# Patient Record
Sex: FEMALE | Race: WHITE
[De-identification: names, ages, dates, MRNs, and addresses within clinical notes are randomized per-mention and may not be internally consistent; named-entity substitution may affect disease eponyms.]

---

## 2019-07-11 NOTE — EKG
Test Reason : PREOP

Blood Pressure : ***/*** mmHG

Vent. Rate : 075 BPM     Atrial Rate : 075 BPM

   P-R Int : 164 ms          QRS Dur : 126 ms

    QT Int : 466 ms       P-R-T Axes : -17 -30 122 degrees

   QTc Int : 520 ms

 

Normal sinus rhythm

Left axis deviation

Left bundle branch block

Abnormal ECG

 

Confirmed by TOMMY HERNANDES ([id number]) on 7/11/2019 4:22:54 PM

 

Referred By:  ARIANA           Confirmed By:TOMMY HERNANDES

## 2019-07-12 NOTE — OP
DATE OF PROCEDURE:  07/11/2019



PREOPERATIVE DIAGNOSES:  Left facial pain, left maxillary mass, left chronic

sinusitis. 



POSTOPERATIVE DIAGNOSES:  Left facial pain, left maxillary mass, left chronic

sinusitis. 



PROCEDURE PERFORMED:  

1. Left nasal endoscopy with maxillary antrostomy with removal of tissue.

2. Left total ethmoidectomy.

3. Left frontal sinusotomy.



PROCEDURE IN DETAIL:  Left nasal endoscopy with maxillary antrostomy with removal of

tissue:  The uncinate was then identified and the extent of the uncinate was

appreciated by out-fracturing the uncinate with the ball-tip probe.  We then used

the sickle blade to disarticulate the uncinate from the lateral nasal wall.  This

was then removed with straight biting and upbiting punches with the remaining

shrouds of mucosa and bony septum removed with the micro-debrider.  The natural os

of the maxillary sinus was then identified and enlarged with the maxillary punches

and back biting forceps. 



Left total ethmoidectomy:  The anterior face of the ethmoid bulla was entered and

with the micro-debrider, dissection continued posteriorly to the ground lamella.

The limits of dissection included the insertion of the middle turbinate, medial

orbital wall, and base of skull.  We similarly identified the frontal recess and

removed shrouds of bone and debris in that region to obtain patency into the agger

nasi region and frontal recess.  We then entered the ground lamella and its

anteroinferior aspect and proceeded posteriorly, opening the posterior ethmoid

air-cell system.  Again, the limits of dissection included the base of skull and

medial orbital wall. 



Left frontal sinusotomy:  Following the ethmoidectomy, we then turned our attention

to the frontal nasal recess. The agger nasi cells were addressed and the frontal

recess was exposed. The natural opening to the frontal sinus was identified. At this

point, any obstructing shrouds of mucosa and bony fragments were removed with a

curved microdebrider. The wound was then examined and found to be free of any

obstructing debris. We then turned our attention to the contralateral side and

performed a similar procedure again under endoscopic visualization using a 45-degree

scope. We were able to visualize the frontal recess. Obstructing shrouds of mucosa

and bone were removed with a microdebrider. The natural os of frontal sinus was

identified and enlarged and irrigated.   At this point, the frontal sinusotomy was

completed and we turned to the next area of concern. 







Job ID:  [id number]

## 2019-08-13 NOTE — PET
PET CT:

8/13/19

 

HISTORY: 

64-year-old female with diffuse large B-cell lymphoma with high proliferative rate of the left maxill
a. 

 

Exam was requested for initial staging. 

 

TECHNIQUE:  

PET scan with CT attenuation correction was performed from the vertex through the proximal thighs fol
lowing the intravenous administration of 12 millicuries of 15-fluorodexoxyglucose in the left antecub
ital fossa. 

 

COMPARISON: 

None.

 

FINDINGS: 

There is increased intensity of digital localization of the left maxilla with an SUV of 30. Hypermeta
bolic lymph nodes are seen in the left level II (SUV 8.5) and level V (SUV 14) lymph nodes. 

 

No jamal hypermetabolism is seen in the right neck, mediastinum, hilar regions, axillae, abdomen, pel
vis or inguinal regions. 

 

No hypermetabolic pulmonary nodules, liver, or adrenal lesions are seen. 

 

There is physiologic activity in the brain, GI and  tracts. 

 

The CT scan used for attenuation correction demonstrates no evidence of pleural effusions or ascites.
 

 

IMPRESSION: 

Viable lymphoma above the level of the diaphragm (Deauville criteria 5).

 

POS: [facility]

## 2019-08-20 NOTE — HP
HISTORY OF PRESENT ILLNESS:  A 64-year-old female from [place], had sinus

problems and then tooth problems.  Saw Dr. Dubose, had tooth extraction.  Biopsy

revealed B-cell lymphoma, left maxillary sinus.  I have been asked to place a

MediPort.  We will plan this as an outpatient under IV sedation.  She had recent

labs that were normal.  We will not repeat those.  She had a PET scan, that does not

reveal any other disease. 



MEDICATIONS:  Levothyroxine, rosuvastatin.



ALLERGIES:  NONE.



HABITS:  Tobacco, none.  Alcohol, none.



PAST SURGICAL HISTORY:  Appendectomy, ruptured ectopic pregnancy, sinus surgery, Dr. Morley, and Dr. Dubose, tooth extraction. 



PAST MEDICAL HISTORY:  Lymphoma, low thyroid treated medically.



REVIEW OF SYSTEMS:  Ten-point noncontributory.



PHYSICAL EXAMINATION:

VITAL SIGNS:  Weight 186 pounds, 5 feet 6 inches, 30 BMI, blood pressure 162/79,

heart rate 92, temperature 99 degrees. 

HEAD, EYES, EARS, NOSE, AND THROAT:  Unremarkable. 

LUNGS:  Clear to auscultation. 

CARDIAC:  Regular rate and rhythm.  No murmur or gallop. 

ABDOMEN:  Soft and nontender.  No masses. 

EXTREMITIES:  Unremarkable.



ASSESSMENT AND PLAN:  B-cell lymphoma diagnosed by [facility].



PLAN:  MediPort placement.  Risks of infection, bleeding, reoperation, malfunction

of the port explained.  I will see her postoperatively as needed.  She will call

whenever they finished her chemotherapy and we can remove the port in the office. 





Job ID:  [id number]

## 2019-08-21 NOTE — RAD
Exam: Chest one view



HISTORY:Status post Mediport insertion



Comparison: None



FINDINGS:

Cardiac silhouette: Normal

Aorta: Unremarkable

Pulmonary vessels: Normal

Costophrenic angles: Clear

Mediport: Right-sided Mediport catheter with the distal tip projecting over the expected region of th
e superior vena cava



LUNGS: No masses or consolidation.



Pneumothorax: None



Osseous abnormalities: None



IMPRESSION: 

1. Placement of a right-sided Mediport catheter. No pneumothorax.



Reported By: Alyson Cuevas 

Electronically Signed:  8/21/2019 11:11 AM

## 2019-08-21 NOTE — OP
DATE OF PROCEDURE:  08/21/2019



PREOPERATIVE DIAGNOSIS:  Lymphoma diagnosed during sinus surgery.



POSTOPERATIVE DIAGNOSIS:  Lymphoma diagnosed during sinus surgery.



PROCEDURE PERFORMED:  Right subclavian vein MediPort, low-profile, fluoroscopy used.



ANESTHESIA:  TIVA, local of 0.5% Marcaine with epinephrine 30 mL mixed with 2%

Xylocaine 10 mL. 



DESCRIPTION OF PROCEDURE:  The patient was taken to the operating room, where under

intravenous sedation, neck and chest prepared with ChloraPrep and draped in routine

fashion.  Local anesthetic was infiltrated in the skin and subcutaneous tissue about

the operative site.  Infraclavicular approach used to cannulate the right subclavian

vein and placing a J-wire.  Skin site was enlarged sharply, carried down through the

skin and subcutaneous tissue and a subcutaneous pocket was created with blunt and

sharp dissection.  Dilator and Peel-Away sheath placed over the J-wire into the

superior vena cava and dilator and J-wire were removed.  Catheter placed with the

Peel-Away sheath.  Peel-Away sheath removed.  Fluoroscopically, catheter tip was

placed in optimal position in the superior vena cava.  Catheter was tailored to

length and connected to the MediPort, which was placed in subcutaneous pocket and

secured with 2 interrupted suture of 3-0 Prolene.  Subcutaneous tissue was

approximated with 3-0 Monocryl, skin with subdermal 4-0 Monocryl, and Derma glue

applied.  MediPort was accessed with the Adamson needle, aspirating blood, which

required some positioning and fluoroscopically, the catheter and port were checked

again in good position.  It was flushed with heparinized saline solution.  The

patient tolerated the procedure well. 







Job ID:  [id number]

## 2019-10-04 NOTE — PET
PET CT SKULL TO MID THIGH:



COMPARISON:

Prior PET/CT August 13, 2019.



HISTORY: 

Diffuse large B-cell lymphoma.



TECHNIQUE: 

A PET/CT was performed from the skull to the mid thigh after administration of 11.8 millicuries of F-
18 FDG. Evaluation was performed on a [employer] workstation.



FINDINGS:



HEAD/NECK: 

There has been interval decrease in the intensity, as well as decreased soft tissue mass centered at 
the left maxillary sinus, with maximum SUV now 8.3, compared to 25 SUV in a similar location on

prior exam. Prior hypermetabolic adenopathy of the left neck has resolved. No new hypermetabolic mahsa
opathy is visualized.



CHEST: 

No areas of hypermetabolic activity.



ABDOMEN/PELVIS: 

No areas of hypermetabolic activity.



SKELETON: 

There is diffuse increased osseous uptake, when compared to prior exam, which may relate to response 
to interval therapy.



IMPRESSION: 

1.  Interval response to therapy with decreased soft tissue mass and decreased SUV of lesion at the l
eft maxillary sinus.



2.  There is also been resolution of prior left neck hypermetabolic adenopathy.



3.  Interval development of diffuse increased uptake of the osseous structures, favoring interval res
ponse to chemotherapy. Imaging follow-up may be obtained, for continued assessment.



Transcribed Date/Time: 10/4/2019 10:45 AM



Reported By: Diego Preston 

Electronically Signed:  10/4/2019 11:01 AM

## 2019-12-03 NOTE — PET
PET CT:

 

HISTORY:  

64-year-old female with diffuse large B cell lymphoma. Exam requested for subsequent evaluation follo
wing last chemotherapy on 10/29/19. Primary chemo was in the left maxilla. 

 

TECHNIQUE:  

PET scanning with CT attenuation correction was performed from the vertex through the proximal thighs
 following the intravenous administration of 11.8 mCi F18-FDG in the left antecubital fossa. 

 

COMPARISON:  

PET CT of 10/04/19. 

 

FINDINGS:

No jamal hypermetabolism is seen in the neck, chest, axilla, abdomen, pelvis, or inguinal regions. No
 hypermetabolic parenchymal nodules, liver, adrenal, or skeletal lesions are seen. 

 

No hypermetabolic activity is seen in the left maxilla. 

 

There is physiologic activity in the GI and  tracts, and the brain and heart. 

 

The CT scan used for attenuation correction demonstrates no evidence of pleural effusions or ascites.
 

 

IMPRESSION: 

No evidence of viable lymphoma (Deauville score of 1). 

 

 

POS: [facility]

## 2019-12-05 NOTE — ULT
EXAM:

Right lower extremity venous Doppler US



HISTORY:

Right lower extremity edema and pain



FINDINGS:

Grayscale, color-flow, Doppler evaluation, spectral analysis of the right lower extremity venous stru
ctures is performed with 2-D imaging. The right common femoral, superficial femoral, popliteal,

posterior tibial, proximal greater saphenous and profunda femoral veins are imaged.



There is absence of compression with some flow secondary to a nonocclusive thrombus in the right popl
iteal vein. Remainder of the deep venous system is otherwise patent.



IMPRESSION:

Findings are consistent with deep vein thrombosis in the right lower extremity.



Discussed over the telephone with Dr. Smith at 1502 hours.



Reported By: Surinder Damon 

Electronically Signed:  12/5/2019 3:02 PM

## 2020-03-24 NOTE — PET
Exam: 

PET SCAN WITH CT ATTENUATION CORRECTION:



COMPARISON:  12/3/2019.



HISTORY: Diffuse large B-cell lymphoma, restaging. Left maxillary disease.



TECHNIQUE: PET scan with CT attenuation correction was performed from the base of the brain to the pr
oximal thighs following the intravenous administration of 12.7 mCi of I-59-yirnrfkztnqaeunmbs



FINDINGS:

Head and neck: There is increased FDG localization involving the vocal cords as well as the longus co
li muscle, likely representing muscular contraction. There is no evidence of abnormal FDG

localization in the region of the left maxilla and left maxillary sinus.

Chest: No abnormal FDG localization.

Abdomen and pelvis: No abnormal FDG localization.

Osseous structures: No abnormal FDG localization.



IMPRESSION: 

No abnormal FDG localization. No evidence of recurrent or residual disease. Deauville score of 1.



Transcribed Date/Time: 3/24/2020 9:59 AM



Reported By: Alyson Cuevas 

Electronically Signed:  3/24/2020 12:29 PM

## 2020-09-23 NOTE — CT
CT of the paranasal sinuses

CT of the neck:

9/23/2020



COMPARISON: CT of the maxillofacial bones 6/18/2019, PET scan 3/24/2020



HISTORY: Maxillary sinus lymphoma, evaluate following chemotherapy and radiation treatments



TECHNIQUE: Axial CT imaging is obtained at 2 mm intervals from the axial level of the centrum semiova
le through the axial level of the lung apices with IV contrast. Coronal and sagittal reformatted

imaging obtained.



FINDINGS: The field of view for this examination includes the entirety of the paranasal sinuses as we
ll as the neck.



The frontal sinuses, the maxillary sinuses, the sphenoid sinuses, and the ethmoid air cells demonstra
te no significant mucosal thickening. There is a focal area of dehiscence involving the floor of

the maxillary sinus posteriorly with no associated soft tissue abnormality, which is likely in the re
gion of the previously noted tumor. There was soft tissue density in this region extending

posterior to and lateral to the maxillary sinus on the 6/18/2019 CT examination which is no longer vi
sualized. The mastoid air cells appear well-aerated.



The visualized lung apices demonstrate a mild degree of bilateral upper lobe groundglass opacity whic
h is likely on the basis of respiratory motion artifact.



There is a right-sided Port-A-Cath, incompletely imaged on this examination.



The retroantral fat, the parapharyngeal fat, the parotid glands, and the submandibular glands appear 
unremarkable.



The tonsillar pillars, epiglottis and preepiglottic fat, hyoid bone, thyroid cartilage, cricoid carti
helen, and level of the glottis appear unremarkable.



There is a hypodense nodule within the mid/superior aspect of the left lobe of the thyroid gland gretchen
uring 2.1 cm in craniocaudal dimension for which a follow-up thyroid ultrasound is advised.



The orbits/globes appear grossly unremarkable.



The vascular structures of the neck demonstrate no acute findings. No lymphadenopathy is noted within
 the neck.



The osseous structures demonstrate degenerative change at the atlantoaxial interspace. There is also 
multilevel mid cervical spine degenerative change with disc space narrowing and bilateral facet

hypertrophy, left greater than right, most prominent at the C5-6 level. No worrisome lytic or blastic
 bone lesions are seen.



IMPRESSION: No evidence for a soft tissue mass in the region of the paranasal sinuses.



No lymphadenopathy in the neck.



Dominant left thyroid nodule. Thyroid ultrasound advised.



Reported By: Duke Braun 

Electronically Signed:  9/23/2020 9:55 AM

## 2020-09-23 NOTE — CT
CT CHEST WITH IV CONTRAST:

 

Date:  09/23/2020

 

HISTORY:  

Diffuse large B cell lymphoma, lymph nodes of head, face, and neck. Personal history of other venous 
thrombosis and embolism. Stage 2 diffuse B cell lymphoma of the left maxillary sinus. 

 

FINDINGS:

 

Correlation is made with PET/CT of 03/24/2020. 

 

No mediastinal, hilar, or axillary mass or lymphadenopathy is seen. There are vascular calcifications
 without evidence of aneurysmal dilatation of the thoracic aorta. No pleural or pericardial effusions
 are identified. 

 

The tracheobronchial tree is patent. No pneumothoraces, focal areas of consolidation, lung nodules, o
r masses are seen. A tiny 5.0 mm low density lesion in the anterior aspect of the liver is likely a c
yst and is stable since the PET CT of 03/24/2020. There are degenerative changes in the spine. 

 

IMPRESSION: 

No evidence of intrathoracic lymphoma. 

 

 

POS: [facility]

## 2020-10-20 NOTE — ULT
THYROID ULTRASOUND:



HISTORY: Thyroid nodule.



FINDINGS: 

Thyroid isthmus: 0.2 cm.

Right thyroid lobe: 1.8 x 3.9 x 1.5 cm.

Left thyroid lobe: 1.4 x 3.5 x 1.5 cm.



Thyroid nodule:

Right thyroid lobe: Isoechoic solid nodule measuring 1.1 x 1.6 x 0.8 cm.

Left thyroid lobe: Isoechoic solid nodule measuring 2.1 x 1.3 x 1.4 cm.



IMPRESSION: 

Solid nodule in the left and right thyroid lobe. Nodules have a tirades score of TR3 mildly suspiciou
s. Followup imaging in one year is recommended.



Transcribed Date/Time: 10/20/2020 12:02 PM



Reported By: Alyson Cuevas 

Electronically Signed:  10/20/2020 1:13 PM

## 2021-03-29 ENCOUNTER — HOSPITAL ENCOUNTER (OUTPATIENT)
Dept: HOSPITAL 92 - CSHCT | Age: 66
Discharge: HOME | End: 2021-03-29
Attending: INTERNAL MEDICINE
Payer: COMMERCIAL

## 2021-03-29 DIAGNOSIS — C83.31: Primary | ICD-10-CM

## 2021-03-29 DIAGNOSIS — E04.1: ICD-10-CM

## 2021-03-29 DIAGNOSIS — R91.8: ICD-10-CM

## 2021-03-29 LAB — ESTIMATED GFR-MDRD - POC: 45

## 2021-03-29 PROCEDURE — 70491 CT SOFT TISSUE NECK W/DYE: CPT

## 2021-03-29 PROCEDURE — 82565 ASSAY OF CREATININE: CPT

## 2021-03-29 PROCEDURE — 71260 CT THORAX DX C+: CPT

## 2022-05-02 ENCOUNTER — HOSPITAL ENCOUNTER (OUTPATIENT)
Dept: HOSPITAL 92 - BICMAMMO | Age: 67
Discharge: HOME | End: 2022-05-02
Attending: FAMILY MEDICINE
Payer: COMMERCIAL

## 2022-05-02 DIAGNOSIS — Z13.820: ICD-10-CM

## 2022-05-02 DIAGNOSIS — Z12.31: Primary | ICD-10-CM

## 2022-05-02 DIAGNOSIS — M85.89: ICD-10-CM

## 2022-05-02 PROCEDURE — 77080 DXA BONE DENSITY AXIAL: CPT

## 2022-05-02 PROCEDURE — 77067 SCR MAMMO BI INCL CAD: CPT

## 2022-05-02 PROCEDURE — 77063 BREAST TOMOSYNTHESIS BI: CPT
